# Patient Record
Sex: FEMALE
[De-identification: names, ages, dates, MRNs, and addresses within clinical notes are randomized per-mention and may not be internally consistent; named-entity substitution may affect disease eponyms.]

---

## 2024-10-02 ENCOUNTER — APPOINTMENT (OUTPATIENT)
Dept: OPHTHALMOLOGY | Facility: CLINIC | Age: 79
End: 2024-10-02
Payer: MEDICARE

## 2024-10-02 ENCOUNTER — NON-APPOINTMENT (OUTPATIENT)
Age: 79
End: 2024-10-02

## 2024-10-02 PROCEDURE — 92250 FUNDUS PHOTOGRAPHY W/I&R: CPT

## 2024-10-02 PROCEDURE — 92004 COMPRE OPH EXAM NEW PT 1/>: CPT

## 2024-10-02 PROCEDURE — 92025 CPTRIZED CORNEAL TOPOGRAPHY: CPT

## 2024-10-02 PROCEDURE — 92136 OPHTHALMIC BIOMETRY: CPT

## 2024-11-20 PROBLEM — Z00.00 ENCOUNTER FOR PREVENTIVE HEALTH EXAMINATION: Status: ACTIVE | Noted: 2024-11-20

## 2024-11-21 RX ORDER — OFLOXACIN 3 MG/ML
1 SOLUTION OPHTHALMIC
Refills: 0 | Status: DISCONTINUED | OUTPATIENT
Start: 2024-11-22 | End: 2024-11-22

## 2024-11-21 RX ORDER — TROPICAMIDE 1 %
1 DROPS OPHTHALMIC (EYE)
Refills: 0 | Status: DISCONTINUED | OUTPATIENT
Start: 2024-11-22 | End: 2024-11-22

## 2024-11-21 RX ORDER — PHENYLEPHRINE HYDROCHLORIDE 25 MG/ML
1 SOLUTION/ DROPS OPHTHALMIC
Refills: 0 | Status: DISCONTINUED | OUTPATIENT
Start: 2024-11-22 | End: 2024-11-22

## 2024-11-21 RX ORDER — TETRACAINE HYDROCHLORIDE 5 MG/ML
1 SOLUTION OPHTHALMIC ONCE
Refills: 0 | Status: DISCONTINUED | OUTPATIENT
Start: 2024-11-22 | End: 2024-11-22

## 2024-11-21 NOTE — ASU PATIENT PROFILE, ADULT - INTERPRETER NAME
Impression: Ocular hypertension, bilateral: H40.053. Plan: Elevate IOP today of 29 OU with 603/608 Pachy. Small C/D ratio of 0.2 in both eyes. RNFL shows no focal thin quadrants with average thicknesses of 105/113. Likely elevated due to thicker than averages pachs. Plan to monitor for now. and repeat RNFL in 6 months. Brad Encarnacion

## 2024-11-21 NOTE — ASU PATIENT PROFILE, ADULT - NS PREOP UNDERSTANDS INFO
Left message for Eris to return our call.    HM, history and recall updated.    No solid food/dairy/candy/gum after 08:00am tomorrow; water allowed before 2:00pm tomorrow; patient reminded to come with photo ID/insurance/credit card; dress in comfortable clothes; no jewelries/contact lens; no smoking/alcohol consumption/recreational drug use today; escort must have an ID card; address and callback number was given/yes

## 2024-11-22 ENCOUNTER — APPOINTMENT (OUTPATIENT)
Dept: OPHTHALMOLOGY | Facility: AMBULATORY SURGERY CENTER | Age: 79
End: 2024-11-22

## 2024-11-22 ENCOUNTER — OUTPATIENT (OUTPATIENT)
Dept: OUTPATIENT SERVICES | Facility: HOSPITAL | Age: 79
LOS: 1 days | Discharge: HOME | End: 2024-11-22
Payer: MEDICAID

## 2024-11-22 ENCOUNTER — NON-APPOINTMENT (OUTPATIENT)
Age: 79
End: 2024-11-22

## 2024-11-22 ENCOUNTER — APPOINTMENT (OUTPATIENT)
Dept: OPHTHALMOLOGY | Facility: CLINIC | Age: 79
End: 2024-11-22
Payer: MEDICAID

## 2024-11-22 VITALS
RESPIRATION RATE: 18 BRPM | SYSTOLIC BLOOD PRESSURE: 162 MMHG | OXYGEN SATURATION: 96 % | WEIGHT: 136.91 LBS | HEART RATE: 82 BPM | TEMPERATURE: 98 F | DIASTOLIC BLOOD PRESSURE: 87 MMHG | HEIGHT: 62 IN

## 2024-11-22 VITALS
SYSTOLIC BLOOD PRESSURE: 104 MMHG | HEART RATE: 84 BPM | OXYGEN SATURATION: 99 % | TEMPERATURE: 99 F | DIASTOLIC BLOOD PRESSURE: 65 MMHG | RESPIRATION RATE: 16 BRPM

## 2024-11-22 DIAGNOSIS — Z90.710 ACQUIRED ABSENCE OF BOTH CERVIX AND UTERUS: Chronic | ICD-10-CM

## 2024-11-22 PROBLEM — I10 ESSENTIAL (PRIMARY) HYPERTENSION: Chronic | Status: ACTIVE | Noted: 2024-11-21

## 2024-11-22 PROCEDURE — 99024 POSTOP FOLLOW-UP VISIT: CPT

## 2024-11-22 PROCEDURE — 66982 XCAPSL CTRC RMVL CPLX WO ECP: CPT | Mod: RT

## 2024-11-22 DEVICE — LENS IOL TECNIS EYHANCE DIB00 22.5D
Type: IMPLANTABLE DEVICE | Status: NON-FUNCTIONAL
Removed: 2024-11-22

## 2024-11-22 RX ORDER — ACETAMINOPHEN 500 MG
650 TABLET ORAL ONCE
Refills: 0 | Status: DISCONTINUED | OUTPATIENT
Start: 2024-11-22 | End: 2024-11-22

## 2024-11-22 RX ORDER — GABAPENTIN 300 MG/1
0 CAPSULE ORAL
Refills: 0 | DISCHARGE

## 2024-11-22 RX ORDER — AMLODIPINE BESYLATE 10 MG
1 TABLET ORAL
Refills: 0 | DISCHARGE

## 2024-11-22 NOTE — OPERATIVE REPORT - OPERATIVE RPOSRT DETAILS
PATIENT:  ANGEL JIMENEZ	    OPERATING SURGEON: Suyeon Yu MD     DATE OF SURGERY:  [ 11/22/24    ]	    ANESTHESIA:  MAC/TOPICAL	    ESTIMATED BLOOD LOSS: < 1mL	    COMPLICATIONS:  [ NONE   ]		    PREOPERATIVE DIAGNOSIS:  Dense Cataract,  [  RIGHT ] eye    POSTOPERATIVE DIAGNOSIS:  Dense Cataract, [  RIGHT ] eye    OPERATIVE PROCEDURE:  Complex Phacoemulsification with insertion  Of posterior chamber intraocular lens with capsular staining dye [   RIGHT ] eye    LENS IMPLANT [    DIB00 22.5D ]    PROCEDURE:	The patient was brought into the operating room and placed supine on the operating room table. After uneventful induction of neuroleptic anesthesia, lidocaine gel was instilled into the operative eye achieving sufficient anesthesia.  The patient was then prepped and draped in the usual sterile fashion.  A wire lid speculum was then inserted into the operative eye giving a wide palpebral fissure.      A vijay knife was used to perform paracenteses through clear cornea at the 12 o’clock limbal position.  The anterior chamber was irrigated with lidocaine 1% nonpreserved, followed by air then trypan. Viscoelastic was then used to maintain the anterior chamber.  A keratome was used to create a clear corneal incision just inside the temporal limbus.  A bent 25 gauge needle was then used to initiate an anterior capsulorhexis, which was completed with the use of capsulorhexis forceps.  Balanced salt solution was used to hydrodissect the nucleus.  The [employer] phacoemulsification unit was then used to completely phacoemulsify the nucleus, following which an aspirating handpiece was used to aspirate all cortical remnants from the capsular bag.  Viscoelastic was again used to reform the anterior chamber and capsular bag.      A new foldable posterior chamber intraocular lens was brought into the surgical field.  It was folded and directly injected into the capsular bag following which it was centered and secure.  All viscoelastic was then aspirated from the anterior segment using an aspirating handpiece.  All wounds were checked for leaks and there were none. Tobradex ointment was instilled into the eye.  The lid speculum was removed from the eye and a shield placed over the eye.      The patient tolerated the procedure well and went to the recovery area in good condition.

## 2024-11-22 NOTE — PRE-ANESTHESIA EVALUATION ADULT - NSANTHOSAYNRD_GEN_A_CORE
No. ARLEY screening performed.  STOP BANG Legend: 0-2 = LOW Risk; 3-4 = INTERMEDIATE Risk; 5-8 = HIGH Risk

## 2024-11-23 PROBLEM — E78.5 HYPERLIPIDEMIA, UNSPECIFIED: Chronic | Status: ACTIVE | Noted: 2024-11-21

## 2024-11-27 ENCOUNTER — NON-APPOINTMENT (OUTPATIENT)
Age: 79
End: 2024-11-27

## 2024-11-27 ENCOUNTER — APPOINTMENT (OUTPATIENT)
Dept: OPHTHALMOLOGY | Facility: CLINIC | Age: 79
End: 2024-11-27
Payer: MEDICARE

## 2024-11-27 PROCEDURE — 99024 POSTOP FOLLOW-UP VISIT: CPT

## 2025-01-08 ENCOUNTER — APPOINTMENT (OUTPATIENT)
Dept: OPHTHALMOLOGY | Facility: CLINIC | Age: 80
End: 2025-01-08
Payer: MEDICARE

## 2025-01-08 ENCOUNTER — NON-APPOINTMENT (OUTPATIENT)
Age: 80
End: 2025-01-08

## 2025-01-08 PROCEDURE — 99024 POSTOP FOLLOW-UP VISIT: CPT

## 2025-01-08 PROCEDURE — 92134 CPTRZ OPH DX IMG PST SGM RTA: CPT

## 2025-02-25 ENCOUNTER — APPOINTMENT (OUTPATIENT)
Dept: OPHTHALMOLOGY | Facility: CLINIC | Age: 80
End: 2025-02-25

## 2025-03-31 NOTE — PACU DISCHARGE NOTE - AIRWAY PATENCY:
Patient's son, Ashok, at bedside and updated on patient's status. All questions answered.    Satisfactory

## 2025-04-07 NOTE — ASU PATIENT PROFILE, ADULT - NS PRO INFO GIVEN TO
Recent PHQ 2/9 Score    PHQ 2:  PHQ 2 Score Adult PHQ 2 Score Adult PHQ 2 Interpretation Little interest or pleasure in activity?   4/7/2025   1:17 PM 4 Further screening needed 2       PHQ 9:  PHQ 9 Score Adult PHQ 9 Score Adult PHQ 9 Interpretation   4/7/2025   1:17 PM 6 Mild Depression     PHQ-2/9 Depression Screening  Little interest or pleasure in activity?: More than half the days  Feeling down, depressed or hopeless?: More than half the days  Initial depression screening score:: 4  PHQ2 Interpretation: Further screening needed  Trouble falling or staying asleep or sleeping all the time?: Not at all  Feeling tired or having little energy?: More than half the days  Poor appetite or overeating?: Not at all  Feeling bad about yourself or that you are a failure or have let yourself or family down?: Not at all  Trouble concentrating on things such as reading the newspaper or watching TV?: Not at all  Moving or speaking slowly that other people have noticed or the opposite - being so fidgety or restless that you have been moving around a lot more than usual?: Not at all  Thoughts that you would be better off dead or of hurting yourself in some way?: Not at all  Total depressive symptoms score (PHQ9): : 6  PHQ9 Interpretation: Mild Depression  If you reported any problems, how difficult have these problems made it to do your work, take care of things at home, or get along with other people?: Not difficult at all     Brad Encarnacion -Son/patient/family

## (undated) DEVICE — KNIFE ALCON SLIT INTREPID CLEAR-CUT SAFETY 2.4MM

## (undated) DEVICE — KNIFE ALCON PARACENTESIS CLEARCUT SIDEPORT 1MM (YELLOW)

## (undated) DEVICE — DRAPE MICROSCOPE KNOB COVER SMALL (2 PCS)

## (undated) DEVICE — TRANSFORMER INTREPID I/A 0.3MM

## (undated) DEVICE — PACK CENTURION 2.4MM

## (undated) DEVICE — NDL PERIBULBAR ATKINSON 25G X 7/8"

## (undated) DEVICE — GOWN SLEEVES

## (undated) DEVICE — TIP OZIL 12 DEGREE MINI FLARE

## (undated) DEVICE — GLV 6.5 PROTEXIS (WHITE)

## (undated) DEVICE — NDL HYPO NONSAFE 30G X 0.5" (BEIGE)

## (undated) DEVICE — Device

## (undated) DEVICE — SOL SYR OPHTHALMIC VISION BLUE TRYPAN BLUE 0.06% 0.5 ML

## (undated) DEVICE — CANNULA IRR ANT CHAMBER 30G

## (undated) DEVICE — CYSTOTOME IRRIG 27G 5X8IN 10SP

## (undated) DEVICE — PACK ANTERIOR SEGMENT

## (undated) DEVICE — SOL IRR BAL SALT 500ML

## (undated) DEVICE — PACK OPHTHALMIC 30 BALANCE TIP CUSTOM 0.9MM

## (undated) DEVICE — SUT NYLON 10-0 12" CU-5

## (undated) DEVICE — CARTRIDGE PLATINUM